# Patient Record
Sex: FEMALE | Race: WHITE | NOT HISPANIC OR LATINO | Employment: FULL TIME | URBAN - METROPOLITAN AREA
[De-identification: names, ages, dates, MRNs, and addresses within clinical notes are randomized per-mention and may not be internally consistent; named-entity substitution may affect disease eponyms.]

---

## 2017-11-17 ENCOUNTER — ALLSCRIPTS OFFICE VISIT (OUTPATIENT)
Dept: OTHER | Facility: OTHER | Age: 57
End: 2017-11-17

## 2017-11-17 DIAGNOSIS — Z13.6 ENCOUNTER FOR SCREENING FOR CARDIOVASCULAR DISORDERS: ICD-10-CM

## 2017-11-17 DIAGNOSIS — R63.4 ABNORMAL WEIGHT LOSS: ICD-10-CM

## 2017-11-17 DIAGNOSIS — Z00.00 ENCOUNTER FOR GENERAL ADULT MEDICAL EXAMINATION WITHOUT ABNORMAL FINDINGS: ICD-10-CM

## 2017-11-17 DIAGNOSIS — Z12.31 ENCOUNTER FOR SCREENING MAMMOGRAM FOR MALIGNANT NEOPLASM OF BREAST: ICD-10-CM

## 2017-11-29 ENCOUNTER — LAB CONVERSION - ENCOUNTER (OUTPATIENT)
Dept: OTHER | Facility: OTHER | Age: 57
End: 2017-11-29

## 2017-11-29 ENCOUNTER — GENERIC CONVERSION - ENCOUNTER (OUTPATIENT)
Dept: OTHER | Facility: OTHER | Age: 57
End: 2017-11-29

## 2017-11-29 LAB
A/G RATIO (HISTORICAL): 2 (CALC) (ref 1–2.5)
ALBUMIN SERPL BCP-MCNC: 4.3 G/DL (ref 3.6–5.1)
ALP SERPL-CCNC: 66 U/L (ref 33–130)
ALT SERPL W P-5'-P-CCNC: 10 U/L (ref 6–29)
AST SERPL W P-5'-P-CCNC: 14 U/L (ref 10–35)
BASOPHILS # BLD AUTO: 0 %
BASOPHILS # BLD AUTO: 0 CELLS/UL (ref 0–200)
BILIRUB SERPL-MCNC: 0.4 MG/DL (ref 0.2–1.2)
BUN SERPL-MCNC: 15 MG/DL (ref 7–25)
BUN/CREA RATIO (HISTORICAL): NORMAL (CALC) (ref 6–22)
CALCIUM SERPL-MCNC: 9.1 MG/DL (ref 8.6–10.4)
CHLORIDE SERPL-SCNC: 104 MMOL/L (ref 98–110)
CHOLEST SERPL-MCNC: 157 MG/DL
CHOLEST/HDLC SERPL: 3.1 (CALC)
CO2 SERPL-SCNC: 31 MMOL/L (ref 20–31)
CREAT SERPL-MCNC: 0.76 MG/DL (ref 0.5–1.05)
DEPRECATED RDW RBC AUTO: 11.9 % (ref 11–15)
EGFR AFRICAN AMERICAN (HISTORICAL): 101 ML/MIN/1.73M2
EGFR-AMERICAN CALC (HISTORICAL): 87 ML/MIN/1.73M2
EOSINOPHIL # BLD AUTO: 0.4 %
EOSINOPHIL # BLD AUTO: 11 CELLS/UL (ref 15–500)
GAMMA GLOBULIN (HISTORICAL): 2.2 G/DL (CALC) (ref 1.9–3.7)
GLUCOSE (HISTORICAL): 91 MG/DL (ref 65–99)
HCT VFR BLD AUTO: 35.6 % (ref 35–45)
HDLC SERPL-MCNC: 51 MG/DL
HGB BLD-MCNC: 12.1 G/DL (ref 11.7–15.5)
LDL CHOLESTEROL (HISTORICAL): 87 MG/DL (CALC)
LYMPHOCYTES # BLD AUTO: 1339 CELLS/UL (ref 850–3900)
LYMPHOCYTES # BLD AUTO: 49.6 %
MCH RBC QN AUTO: 32.7 PG (ref 27–33)
MCHC RBC AUTO-ENTMCNC: 34 G/DL (ref 32–36)
MCV RBC AUTO: 96.2 FL (ref 80–100)
MONOCYTES # BLD AUTO: 273 CELLS/UL (ref 200–950)
MONOCYTES (HISTORICAL): 10.1 %
NEUTROPHILS # BLD AUTO: 1077 CELLS/UL (ref 1500–7800)
NEUTROPHILS # BLD AUTO: 39.9 %
NON-HDL-CHOL (CHOL-HDL) (HISTORICAL): 106 MG/DL (CALC)
PLATELET # BLD AUTO: 205 THOUSAND/UL (ref 140–400)
PMV BLD AUTO: 10.2 FL (ref 7.5–12.5)
POTASSIUM SERPL-SCNC: 4 MMOL/L (ref 3.5–5.3)
RBC # BLD AUTO: 3.7 MILLION/UL (ref 3.8–5.1)
SODIUM SERPL-SCNC: 139 MMOL/L (ref 135–146)
TOTAL PROTEIN (HISTORICAL): 6.5 G/DL (ref 6.1–8.1)
TRIGL SERPL-MCNC: 96 MG/DL
TSH SERPL DL<=0.05 MIU/L-ACNC: 1.32 MIU/L (ref 0.4–4.5)
WBC # BLD AUTO: 2.7 THOUSAND/UL (ref 3.8–10.8)

## 2017-12-07 ENCOUNTER — GENERIC CONVERSION - ENCOUNTER (OUTPATIENT)
Dept: OTHER | Facility: OTHER | Age: 57
End: 2017-12-07

## 2018-01-08 DIAGNOSIS — D69.6 THROMBOCYTOPENIA (HCC): ICD-10-CM

## 2018-01-09 ENCOUNTER — GENERIC CONVERSION - ENCOUNTER (OUTPATIENT)
Dept: OTHER | Facility: OTHER | Age: 58
End: 2018-01-09

## 2018-01-09 ENCOUNTER — LAB CONVERSION - ENCOUNTER (OUTPATIENT)
Dept: OTHER | Facility: OTHER | Age: 58
End: 2018-01-09

## 2018-01-09 LAB
BASOPHILS # BLD AUTO: 0.3 %
BASOPHILS # BLD AUTO: 11 CELLS/UL (ref 0–200)
DEPRECATED RDW RBC AUTO: 12 % (ref 11–15)
EOSINOPHIL # BLD AUTO: 0.6 %
EOSINOPHIL # BLD AUTO: 21 CELLS/UL (ref 15–500)
HCT VFR BLD AUTO: 35.6 % (ref 35–45)
HGB BLD-MCNC: 12.2 G/DL (ref 11.7–15.5)
LYMPHOCYTES # BLD AUTO: 1726 CELLS/UL (ref 850–3900)
LYMPHOCYTES # BLD AUTO: 49.3 %
MCH RBC QN AUTO: 33.2 PG (ref 27–33)
MCHC RBC AUTO-ENTMCNC: 34.3 G/DL (ref 32–36)
MCV RBC AUTO: 97 FL (ref 80–100)
MONOCYTES # BLD AUTO: 343 CELLS/UL (ref 200–950)
MONOCYTES (HISTORICAL): 9.8 %
NEUTROPHILS # BLD AUTO: 1400 CELLS/UL (ref 1500–7800)
NEUTROPHILS # BLD AUTO: 40 %
PLATELET # BLD AUTO: 221 THOUSAND/UL (ref 140–400)
PMV BLD AUTO: 10.2 FL (ref 7.5–12.5)
RBC # BLD AUTO: 3.67 MILLION/UL (ref 3.8–5.1)
WBC # BLD AUTO: 3.5 THOUSAND/UL (ref 3.8–10.8)

## 2018-01-10 NOTE — PROGRESS NOTES
Assessment    1  Encounter for preventive health examination (V70 0) (Z00 00)   2  Visit for screening mammogram (V76 12) (Z12 31)   3  Recent weight loss (783 21) (R63 4)   4  Encounter for screening for cardiovascular disorders (V81 2) (Z13 6)    Plan  Encounter for screening for cardiovascular disorders    · (1) LIPID PANEL, FASTING; Status:Active; Requested for:17Nov2017; Health Maintenance    · Follow-up visit in 1 year Evaluation and Treatment  Follow-up  Status: Hold For -  Scheduling  Requested for: [id number]  Health Maintenance, Recent weight loss    · (1) CBC/PLT/DIFF; Status:Active; Requested for:17Nov2017;    · (1) COMPREHENSIVE METABOLIC PANEL; Status:Active; Requested for:17Nov2017;    · (Q) TSH, 3RD GENERATION W/REFLEX TO FT4; Status:Active; Requested  for:17Nov2017;   Screening for colon cancer    · 1 - Anjum JEAN, [facility]  *  Status: Active  Requested  for: 10GMA6513  Care Summary provided  : Yes  Visit for screening mammogram    · * MAMMO SCREENING BILATERAL W CAD; Status:Hold For - Scheduling; Requested  for:17Nov2017;     Discussion/Summary  healthy adult female Currently, she eats a healthy diet and has an adequate exercise regimen  the risks and benefits of cervical cancer screening were discussed next cervical cancer screening is due Breast cancer screening: mammogram has been ordered  Colorectal cancer screening: colonoscopy has been ordered  Screening lab work includes hemoglobin, glucose, lipid profile and thyroid function testing  The risks and benefits of immunizations were discussed, immunizations are up to date, patient declines immunizations and Flu vaccine  Advice and education were given regarding nutrition, aerobic exercise, weight bearing exercise, calcium supplements, vitamin D supplements, reproductive health, contraception, cardiovascular risk reduction and alcohol use     The patient was counseled regarding instructions for management, risk factor reductions, patient and family education, impressions, importance of compliance with treatment  Possible side effects of new medications were reviewed with the patient/guardian today  The treatment plan was reviewed with the patient/guardian  The patient/guardian understands and agrees with the treatment plan      Chief Complaint  New patient preventative      History of Present Illness  HM, Adult Female: The patient is being seen for a health maintenance evaluation  The last health maintenance visit was year(s) ago  Social History: Household members include 2 kids  She is   The patient has never smoked cigarettes  She reports occasional alcohol use  She has never used illicit drugs  General Health: The patient's health since the last visit is described as good  She has regular dental visits  She denies vision problems  Lifestyle:  She consumes a diverse and healthy diet  She does not have any weight concerns  She exercises regularly  She does not use tobacco  She denies alcohol use  She denies drug use  Reproductive health: the patient is postmenopausal   4 yrs  No postmenopausal spotting or bleeding  Screening: Cervical cancer screening includes a pap smear performed 6 yrs ago  Breast cancer screening includes a mammogram performed 6 yrs ago  She hasn't been previously screened for colorectal cancer  Metabolic screening includes uncertain timing of her last lipid profile, uncertain timing of her last glucose screening and uncertain timing of her last thyroid function test    HPI: Patient is here to reestablish care  Patient does not have any concerns today  Patient states that she recently modified her diet and stopped eating carbs  Patient states that she has lost around 20 pounds in the past few months  She is not up-to-date on any of her preventative screening  Patient declines flu shot today  Review of Systems    Constitutional: as noted in HPI     Eyes: No complaints of eye pain, no red eyes, no eyesight problems, no discharge, no dry eyes, no itching of eyes  ENT: no complaints of earache, no loss of hearing, no nose bleeds, no nasal discharge, no sore throat, no hoarseness  Cardiovascular: No complaints of slow heart rate, no fast heart rate, no chest pain, no palpitations, no leg claudication, no lower extremity edema  Respiratory: No complaints of shortness of breath, no wheezing, no cough, no SOB on exertion, no orthopnea, no PND  Gastrointestinal: No complaints of abdominal pain, no constipation, no nausea or vomiting, no diarrhea, no bloody stools  Genitourinary: No complaints of dysuria, no incontinence, no pelvic pain, no dysmenorrhea, no vaginal discharge or bleeding  Musculoskeletal: No complaints of arthralgias, no myalgias, no joint swelling or stiffness, no limb pain or swelling  Integumentary: No complaints of skin rash or lesions, no itching, no skin wounds, no breast pain or lump  Neurological: No complaints of headache, no confusion, no convulsions, no numbness, no dizziness or fainting, no tingling, no limb weakness, no difficulty walking  Psychiatric: Not suicidal, no sleep disturbance, no anxiety or depression, no change in personality, no emotional problems  Endocrine: No complaints of proptosis, no hot flashes, no muscle weakness, no deepening of the voice, no feelings of weakness  Hematologic/Lymphatic: No complaints of swollen glands, no swollen glands in the neck, does not bleed easily, does not bruise easily  Active Problems    1  Cervical cancer screening (V76 2) (Z12 4)   2  Encounter for routine gynecological examination (V72 31) (Z01 419)   3  Encounter for screening colonoscopy (V76 51) (Z12 11)   4  Encounter for screening mammogram for malignant neoplasm of breast (V76 12)   (Z12 31)   5  Other screening mammogram (V76 12) (Z12 31)   6   Screening for colon cancer (V76 51) (Z12 11)    Past Medical History    · Acute atopic conjunctivitis, unspecified laterality (372 05) (H10 10)    Family History  Mother    · Denied: Family history of depression   · Denied: Family history of substance abuse  Father    · Denied: Family history of depression   · Denied: Family history of substance abuse  Sibling    · Denied: Family history of depression   · Denied: Family history of substance abuse    Social History    · Former smoker (G55 14) (E36 314)   · Former smoker (G71 80) (Q02 954)    Current Meds   1  No Reported Medications Recorded    Allergies    1  No Known Drug Allergies    Vitals   Recorded: 03KEV0382 09:47AM   Heart Rate 62   Respiration 14   Systolic 131   Diastolic 72   Height 5 ft 6 in   Weight 133 lb    BMI Calculated 21 47   BSA Calculated 1 68   O2 Saturation 99     Physical Exam    Constitutional   General appearance: No acute distress, well appearing and well nourished  Ears, Nose, Mouth, and Throat   Otoscopic examination: Tympanic membranes translucent with normal light reflex  Canals patent without erythema  Oropharynx: Normal with no erythema, edema, exudate or lesions  Neck   Neck: Supple, symmetric, trachea midline, no masses  Pulmonary   Respiratory effort: No increased work of breathing or signs of respiratory distress  Auscultation of lungs: Clear to auscultation  Cardiovascular   Auscultation of heart: Normal rate and rhythm, normal S1 and S2, no murmurs  Peripheral vascular exam: Normal     Abdomen   Abdomen: Non-tender, no masses  Liver and spleen: No hepatomegaly or splenomegaly  Musculoskeletal   Gait and station: Normal     Digits and nails: Normal without clubbing or cyanosis  Stability: Normal     Neurologic   Cortical function: Normal mental status  Coordination: Normal finger to nose and heel to shin      Psychiatric   Orientation to person, place, and time: Normal     Mood and affect: Normal        Procedure    Procedure:   Results: 20/25 in both eyes without corrective device, 20/25 in the right eye without corrective device, 20/25 in the left eye without corrective device      Health Management  Cervical cancer screening   (every) THINPREP PAP; every 3 years; Next Due: 35VVP0179; Overdue  Other screening mammogram   Digital Bilateral Screening Mammogram With CAD; every 1 year; Next Due: 00DGV5501; Overdue  Screening for colon cancer   COLONOSCOPY; every 10 years; Next Due: 49UQF7749;  Overdue    Signatures   Electronically signed by : Ridge Mistry MD; Nov 17 2017 11:25AM EST                       (Author)

## 2018-01-12 VITALS
SYSTOLIC BLOOD PRESSURE: 112 MMHG | WEIGHT: 133 LBS | RESPIRATION RATE: 14 BRPM | HEIGHT: 66 IN | OXYGEN SATURATION: 99 % | HEART RATE: 62 BPM | BODY MASS INDEX: 21.38 KG/M2 | DIASTOLIC BLOOD PRESSURE: 72 MMHG

## 2018-01-12 NOTE — RESULT NOTES
Verified Results  (Q) TSH, 3RD GENERATION W/REFLEX TO FT4 [id number] 07:01AM [facility]   REPORT COMMENT:  FASTING:YES     Test Name Result Flag Reference   TSH W/REFLEX TO FT4 1 32 mIU/L  0 40-4 50     (1) LIPID PANEL, FASTING [id number] 07:01AM [facility]     Test Name Result Flag Reference   CHOLESTEROL, TOTAL 157 mg/dL  <200   HDL CHOLESTEROL 51 mg/dL  >87   TRIGLICERIDES 96 mg/dL  <920   LDL-CHOLESTEROL 87 mg/dL (calc)     Reference range: <100     Desirable range <100 mg/dL for patients with CHD or  diabetes and <70 mg/dL for diabetic patients with  known heart disease  LDL-C is now calculated using the Daniel-Caballero   calculation, which is a validated novel method providing   better accuracy than the Friedewald equation in the   estimation of LDL-C  [facility]  8853;529(53): 4500-3422   (http://Pathable/faq/ZJG565)   CHOL/HDLC RATIO 3 1 (calc)  <5 0   NON HDL CHOLESTEROL 106 mg/dL (calc)  <130   For patients with diabetes plus 1 major ASCVD risk   factor, treating to a non-HDL-C goal of <100 mg/dL   (LDL-C of <70 mg/dL) is considered a therapeutic   option  (1) COMPREHENSIVE METABOLIC PANEL [id number] 98:25NW [facility]     Test Name Result Flag Reference   GLUCOSE 91 mg/dL  65-99   Fasting reference interval   UREA NITROGEN (BUN) 15 mg/dL  7-25   CREATININE 0 76 mg/dL  0 50-1 05   For patients >52years of age, the reference limit  for Creatinine is approximately 13% higher for people  identified as -American  eGFR NON-AFR   AMERICAN 87 mL/min/1 73m2  > OR = 60   eGFR AFRICAN AMERICAN 101 mL/min/1 73m2  > OR = 60   BUN/CREATININE RATIO   9-81   NOT APPLICABLE (calc)   SODIUM 139 mmol/L  135-146   POTASSIUM 4 0 mmol/L  3 5-5 3   CHLORIDE 104 mmol/L     CARBON DIOXIDE 31 mmol/L  20-31   CALCIUM 9 1 mg/dL  8 6-10 4   PROTEIN, TOTAL 6 5 g/dL  6 1-8 1   ALBUMIN 4 3 g/dL  3 6-5 1   GLOBULIN 2 2 g/dL (calc)  1 9-3 7   ALBUMIN/GLOBULIN RATIO 2 0 (calc)  1 0-2 5   BILIRUBIN, TOTAL 0 4 mg/dL  0 2-1 2   ALKALINE PHOSPHATASE 66 U/L     AST 14 U/L  10-35   ALT 10 U/L  6-29     (1) CBC/PLT/DIFF [id number] 07:01AM [facility]     Test Name Result Flag Reference   WHITE BLOOD CELL COUNT 2 7 Thousand/uL L 3 8-10 8   RED BLOOD CELL COUNT 3 70 Million/uL L 3 80-5 10   HEMOGLOBIN 12 1 g/dL  11 7-15 5   HEMATOCRIT 35 6 %  35 0-45 0   MCV 96 2 fL  80 0-100 0   MCH 32 7 pg  27 0-33 0   MCHC 34 0 g/dL  32 0-36 0   RDW 11 9 %  11 0-15 0   PLATELET COUNT 018 Thousand/uL  140-400   ABSOLUTE NEUTROPHILS 1077 cells/uL L 5610-7004   ABSOLUTE LYMPHOCYTES 1339 cells/uL  850-3900   ABSOLUTE MONOCYTES 273 cells/uL  200-950   ABSOLUTE EOSINOPHILS 11 cells/uL L    ABSOLUTE BASOPHILS 0 cells/uL  0-200   NEUTROPHILS 39 9 %     LYMPHOCYTES 49 6 %     MONOCYTES 10 1 %     EOSINOPHILS 0 4 %     BASOPHILS 0 0 %     MPV 10 2 fL  7 5-12 5

## 2018-01-23 NOTE — RESULT NOTES
Discussion/Summary   Repeat CBC x 6 months  Verified Results  (1) CBC/PLT/DIFF [id number] 07:02AM [facility]     Test Name Result Flag Reference   WHITE BLOOD CELL COUNT 3 5 Thousand/uL L 3 8-10 8   RED BLOOD CELL COUNT 3 67 Million/uL L 3 80-5 10   HEMOGLOBIN 12 2 g/dL  11 7-15 5   HEMATOCRIT 35 6 %  35 0-45 0   MCV 97 0 fL  80 0-100 0   MCH 33 2 pg H 27 0-33 0   MCHC 34 3 g/dL  32 0-36 0   RDW 12 0 %  11 0-15 0   PLATELET COUNT 695 Thousand/uL  140-400   ABSOLUTE NEUTROPHILS 1400 cells/uL L 9552-8538   ABSOLUTE LYMPHOCYTES 1726 cells/uL  850-3900   ABSOLUTE MONOCYTES 343 cells/uL  200-950   ABSOLUTE EOSINOPHILS 21 cells/uL     ABSOLUTE BASOPHILS 11 cells/uL  0-200   NEUTROPHILS 40 %     LYMPHOCYTES 49 3 %     MONOCYTES 9 8 %     EOSINOPHILS 0 6 %     BASOPHILS 0 3 %     MPV 10 2 fL  7 5-12 5       Plan  Leukopenia    · (1) CBC/PLT/DIFF; Status:Active; Requested KEF:[id number];    · (1) COMPREHENSIVE METABOLIC PANEL; Status:Active;  Requested PZX:[id number];

## 2018-01-24 VITALS
BODY MASS INDEX: 21.38 KG/M2 | OXYGEN SATURATION: 98 % | HEART RATE: 65 BPM | RESPIRATION RATE: 14 BRPM | HEIGHT: 66 IN | SYSTOLIC BLOOD PRESSURE: 130 MMHG | WEIGHT: 133 LBS | DIASTOLIC BLOOD PRESSURE: 82 MMHG

## 2018-02-09 ENCOUNTER — OFFICE VISIT (OUTPATIENT)
Dept: OBGYN CLINIC | Facility: CLINIC | Age: 58
End: 2018-02-09
Payer: COMMERCIAL

## 2018-02-09 VITALS
SYSTOLIC BLOOD PRESSURE: 110 MMHG | BODY MASS INDEX: 21.66 KG/M2 | HEIGHT: 65 IN | DIASTOLIC BLOOD PRESSURE: 68 MMHG | WEIGHT: 130 LBS

## 2018-02-09 DIAGNOSIS — Z01.419 WELL WOMAN EXAM WITH ROUTINE GYNECOLOGICAL EXAM: Primary | ICD-10-CM

## 2018-02-09 DIAGNOSIS — N95.2 VAGINAL ATROPHY: ICD-10-CM

## 2018-02-09 PROBLEM — D72.819 LEUKOPENIA: Status: ACTIVE | Noted: 2017-12-07

## 2018-02-09 PROBLEM — R63.4 RECENT WEIGHT LOSS: Status: ACTIVE | Noted: 2017-11-17

## 2018-02-09 PROCEDURE — 87624 HPV HI-RISK TYP POOLED RSLT: CPT | Performed by: OBSTETRICS & GYNECOLOGY

## 2018-02-09 PROCEDURE — G0145 SCR C/V CYTO,THINLAYER,RESCR: HCPCS | Performed by: OBSTETRICS & GYNECOLOGY

## 2018-02-09 PROCEDURE — S0610 ANNUAL GYNECOLOGICAL EXAMINA: HCPCS | Performed by: OBSTETRICS & GYNECOLOGY

## 2018-02-09 NOTE — PROGRESS NOTES
ASSESSMENT & PLAN: Marcie Sherman is a 62 y o   with normal gynecologic exam     1   Routine well woman exam done today  2  Pap and HPV:  The patient's last pap was   Pap and cotesting was done today  Current ASCCP Guidelines reviewed  3   Mammogram already scheduled  4  Colonoscopy- declined  5  The following were reviewed in today's visit: breast self exam, mammography screening ordered, menopause, adequate intake of calcium and vitamin D, exercise and healthy diet  6  Vaginal Atrophy- Discussed use of estrace cream   Patient is unsure if she would like to use it  If she decides that she does, will come to office to  samples, and will need a 3 month estrace check      CC:  Annual Gynecologic Examination    HPI: Marcie Sherman is a 62 y o   who presents for annual gynecologic examination  She has the following concerns:  Patient has not been sexually active for years because of pain and lack of desire  Does not speak about it with her   Has twin girls (27 yo) by  and c/s (second twin was a surprise)    Health Maintenance:    She exercises 4 days per week with treadmill  She wears her seatbelt routinely  She does not perform regular monthly self breast exams  She feels safe at home  Patients does follow a healthy diet  Her last pap   Last mammogram: in her 42's  Last colonoscopy: never    History reviewed  No pertinent past medical history  Past Surgical History:   Procedure Laterality Date     SECTION         Past OB/Gyn History:  OB History      Para Term  AB Living    1         2    SAB TAB Ectopic Multiple Live Births          1 1         No LMP recorded  Patient is postmenopausal    History of sexually transmitted infection No  History of abnormal pap smears  Yes in her 20's  Patient is not currently sexually active  History reviewed  No pertinent family history      Social History:  Social History     Social History  Marital status: /Civil Union     Spouse name: N/A    Number of children: N/A    Years of education: N/A     Occupational History    Not on file  Social History Main Topics    Smoking status: Former Smoker    Smokeless tobacco: Never Used    Alcohol use 0 6 oz/week     1 Glasses of wine per week    Drug use: No    Sexual activity: Not Currently     Other Topics Concern    Not on file     Social History Narrative    No narrative on file     Presently lives with   Patient is   Patient is currently employed  No Known Allergies  No current outpatient prescriptions on file  Review of Systems:  A complete review of systems was performed and was negative, except as listed  none    Physical Exam:  /68   Ht 5' 5" (1 651 m)   Wt 59 kg (130 lb)   BMI 21 63 kg/m²    GEN: The patient was alert and oriented x3, pleasant well-appearing female in no acute distress  HEENT:  Unremarkable, no anterior or posterior lymphadenopathy, no thyromegaly  CV:  RRR, no murmurs  RESP:  Clear to auscultation bilaterally  BREAST:  Symmetric breasts with no palpable breast masses or obvious breast lesions  She has no retractions or nipple discharge  She has no axillary abnormalities or palpable masses  Self breast exam is taught  ABD:  Soft, nontender, nondistended, normoactive bowel sounds,  EXT:  WWP, nontender, no edema  BACK:  No CVA tenderness, no tenderness to palpation along spine  PELVIC:  Normal appearing external female genitalia, atrophic vaginal epithelium, No discharge  Cervix present   Bimanual: absent CMT,  normal uterus, non-tender  No palpable adnexal masses

## 2018-02-14 ENCOUNTER — HOSPITAL ENCOUNTER (OUTPATIENT)
Dept: MAMMOGRAPHY | Facility: HOSPITAL | Age: 58
Discharge: HOME | End: 2018-02-14
Payer: COMMERCIAL

## 2018-02-14 DIAGNOSIS — Z12.31 ENCOUNTER FOR SCREENING MAMMOGRAM FOR MALIGNANT NEOPLASM OF BREAST: ICD-10-CM

## 2018-02-14 LAB
HPV RRNA GENITAL QL NAA+PROBE: NORMAL
LAB AP GYN PRIMARY INTERPRETATION: NORMAL
Lab: NORMAL

## 2018-02-14 PROCEDURE — 77067 SCR MAMMO BI INCL CAD: CPT

## 2018-06-18 DIAGNOSIS — D72.819 DECREASED WHITE BLOOD CELL COUNT: ICD-10-CM

## 2018-06-19 LAB
ALBUMIN SERPL-MCNC: 4.2 G/DL (ref 3.6–5.1)
ALBUMIN/GLOB SERPL: 1.9 (CALC) (ref 1–2.5)
ALP SERPL-CCNC: 69 U/L (ref 33–130)
ALT SERPL-CCNC: 12 U/L (ref 6–29)
AST SERPL-CCNC: 17 U/L (ref 10–35)
BASOPHILS # BLD AUTO: 10 CELLS/UL (ref 0–200)
BASOPHILS NFR BLD AUTO: 0.3 %
BILIRUB SERPL-MCNC: 0.4 MG/DL (ref 0.2–1.2)
BUN SERPL-MCNC: 16 MG/DL (ref 7–25)
BUN/CREAT SERPL: NORMAL (CALC) (ref 6–22)
CALCIUM SERPL-MCNC: 9.4 MG/DL (ref 8.6–10.4)
CHLORIDE SERPL-SCNC: 104 MMOL/L (ref 98–110)
CO2 SERPL-SCNC: 30 MMOL/L (ref 20–31)
CREAT SERPL-MCNC: 0.83 MG/DL (ref 0.5–1.05)
EOSINOPHIL # BLD AUTO: 10 CELLS/UL (ref 15–500)
EOSINOPHIL NFR BLD AUTO: 0.3 %
ERYTHROCYTE [DISTWIDTH] IN BLOOD BY AUTOMATED COUNT: 12.3 % (ref 11–15)
GLOBULIN SER CALC-MCNC: 2.2 G/DL (CALC) (ref 1.9–3.7)
GLUCOSE SERPL-MCNC: 81 MG/DL (ref 65–99)
HCT VFR BLD AUTO: 34.9 % (ref 35–45)
HGB BLD-MCNC: 11.9 G/DL (ref 11.7–15.5)
LYMPHOCYTES # BLD AUTO: 1827 CELLS/UL (ref 850–3900)
LYMPHOCYTES NFR BLD AUTO: 57.1 %
MCH RBC QN AUTO: 33.3 PG (ref 27–33)
MCHC RBC AUTO-ENTMCNC: 34.1 G/DL (ref 32–36)
MCV RBC AUTO: 97.8 FL (ref 80–100)
MONOCYTES # BLD AUTO: 285 CELLS/UL (ref 200–950)
MONOCYTES NFR BLD AUTO: 8.9 %
NEUTROPHILS # BLD AUTO: 1069 CELLS/UL (ref 1500–7800)
NEUTROPHILS NFR BLD AUTO: 33.4 %
PLATELET # BLD AUTO: 211 THOUSAND/UL (ref 140–400)
PMV BLD REES-ECKER: 10 FL (ref 7.5–12.5)
POTASSIUM SERPL-SCNC: 3.8 MMOL/L (ref 3.5–5.3)
PROT SERPL-MCNC: 6.4 G/DL (ref 6.1–8.1)
RBC # BLD AUTO: 3.57 MILLION/UL (ref 3.8–5.1)
SL AMB EGFR AFRICAN AMERICAN: 90 ML/MIN/1.73M2
SL AMB EGFR NON AFRICAN AMERICAN: 78 ML/MIN/1.73M2
SODIUM SERPL-SCNC: 140 MMOL/L (ref 135–146)
WBC # BLD AUTO: 3.2 THOUSAND/UL (ref 3.8–10.8)

## 2018-06-26 ENCOUNTER — TELEPHONE (OUTPATIENT)
Dept: FAMILY MEDICINE CLINIC | Facility: CLINIC | Age: 58
End: 2018-06-26

## 2018-06-26 NOTE — TELEPHONE ENCOUNTER
PUT PATIENT IN FOR 6/27/2018 WIT DR Christa Law   PER HER INSTRUCTIONS ON LAB TO HAVE PATIENT FOLLOW UP

## 2018-06-27 ENCOUNTER — OFFICE VISIT (OUTPATIENT)
Dept: FAMILY MEDICINE CLINIC | Facility: CLINIC | Age: 58
End: 2018-06-27
Payer: COMMERCIAL

## 2018-06-27 VITALS
HEART RATE: 78 BPM | SYSTOLIC BLOOD PRESSURE: 120 MMHG | WEIGHT: 126 LBS | OXYGEN SATURATION: 98 % | HEIGHT: 65 IN | BODY MASS INDEX: 20.99 KG/M2 | DIASTOLIC BLOOD PRESSURE: 62 MMHG

## 2018-06-27 DIAGNOSIS — Z12.11 ENCOUNTER FOR SCREENING COLONOSCOPY: ICD-10-CM

## 2018-06-27 DIAGNOSIS — R79.89 ABNORMAL CBC: Primary | ICD-10-CM

## 2018-06-27 PROCEDURE — 3008F BODY MASS INDEX DOCD: CPT | Performed by: FAMILY MEDICINE

## 2018-06-27 PROCEDURE — 99213 OFFICE O/P EST LOW 20 MIN: CPT | Performed by: FAMILY MEDICINE

## 2018-06-27 PROCEDURE — 1036F TOBACCO NON-USER: CPT | Performed by: FAMILY MEDICINE

## 2018-06-27 NOTE — PROGRESS NOTES
Subjective:      Patient ID: Benita Hercules is a 62 y o  female  HPI   patient is here  To review labs which were ordered to follow-up on her cell counts  Her labs reveal a persistently low white  And red cell count  Her platelet numbers are within normal limits  Patient is denying any symptoms of frequent sore throat/fever/chills/swellings in her neck/night sweats/bone pain  Currently not on any medications  states that overall she feels very healthy  History reviewed  No pertinent past medical history  History reviewed  No pertinent family history  Past Surgical History:   Procedure Laterality Date     SECTION          reports that she has quit smoking  She has never used smokeless tobacco  She reports that she drinks about 0 6 oz of alcohol per week   She reports that she does not use drugs  No current outpatient prescriptions on file  The following portions of the patient's history were reviewed and updated as appropriate: allergies, current medications, past family history, past medical history, past social history, past surgical history and problem list     Review of Systems   Constitutional: Negative  Respiratory: Negative  Negative for shortness of breath  Cardiovascular: Negative  Negative for chest pain and palpitations  Musculoskeletal: Negative for myalgias  Neurological: Negative  Psychiatric/Behavioral: Negative  Objective:    /62   Pulse 78   Ht 5' 5" (1 651 m)   Wt 57 2 kg (126 lb)   SpO2 98%   BMI 20 97 kg/m²      Physical Exam   Constitutional: She is oriented to person, place, and time  She appears well-developed and well-nourished  Cardiovascular: Normal rate, regular rhythm and normal heart sounds  Pulmonary/Chest: Effort normal and breath sounds normal    Abdominal: Soft  Bowel sounds are normal    Neurological: She is alert and oriented to person, place, and time     Psychiatric: Her behavior is normal  Judgment normal  Recent Results (from the past 1008 hour(s))   Comprehensive metabolic panel    Collection Time: 06/18/18  7:05 AM   Result Value Ref Range    SL AMB GLUCOSE 81 65 - 99 mg/dL    BUN 16 7 - 25 mg/dL    Creatinine, Serum 0 83 0 50 - 1 05 mg/dL    eGFR Non  78 > OR = 60 mL/min/1 73m2    SL AMB EGFR  90 > OR = 60 mL/min/1 73m2    SL AMB BUN/CREATININE RATIO NOT APPLICABLE 6 - 22 (calc)    SL AMB SODIUM 140 135 - 146 mmol/L    SL AMB POTASSIUM 3 8 3 5 - 5 3 mmol/L    SL AMB CHLORIDE 104 98 - 110 mmol/L    SL AMB CARBON DIOXIDE 30 20 - 31 mmol/L    SL AMB CALCIUM 9 4 8 6 - 10 4 mg/dL    SL AMB PROTEIN, TOTAL 6 4 6 1 - 8 1 g/dL    Serum Albumin 4 2 3 6 - 5 1 g/dL    SL AMB GLOBULIN 2 2 1 9 - 3 7 g/dL (calc)    SL AMB ALBUMIN/GLOBULIN RATIO 1 9 1 0 - 2 5 (calc)    SL AMB BILIRUBIN, TOTAL 0 4 0 2 - 1 2 mg/dL    SL AMB ALKALINE PHOSPHATASE 69 33 - 130 U/L    SL AMB AST 17 10 - 35 U/L    SL AMB ALT 12 6 - 29 U/L   CBC and differential    Collection Time: 06/18/18  7:05 AM   Result Value Ref Range    SL AMB LAB WHITE BLOOD CELL COUNT 3 2 (L) 3 8 - 10 8 Thousand/uL    SL AMB LAB RED BLOOD CELLS 3 57 (L) 3 80 - 5 10 Million/uL    Hemoglobin 11 9 11 7 - 15 5 g/dL    Hematocrit 34 9 (L) 35 0 - 45 0 %    MCV 97 8 80 0 - 100 0 fL    MCH 33 3 (H) 27 0 - 33 0 pg    MCHC 34 1 32 0 - 36 0 g/dL    RDW 12 3 11 0 - 15 0 %    Platelet Count 444 912 - 400 Thousand/uL    SL AMB MPV 10 0 7 5 - 12 5 fL    Neutrophils (Absolute) 1,069 (L) 1,500 - 7,800 cells/uL    Lymphocytes (Absolute) 1,827 850 - 3,900 cells/uL    Monocytes (Absolute) 285 200 - 950 cells/uL    Eosinophils (Absolute) 10 (L) 15 - 500 cells/uL    Basophils (Absolute) 10 0 - 200 cells/uL    Neutrophils 33 4 %    Lymphocytes 57 1 %    Monocytes 8 9 %    Eosinophils 0 3 %    Basophils 0 3 %       Assessment/Plan:    Persisting abnormality of the  WBCs and RBCs, for which pt advised to consult specialist     Diagnoses and all orders for this visit:    Abnormal CBC  -     Ambulatory referral to Hematology / Oncology; Future  -     US abdomen complete; Future  -     CBC and differential; Future  -     Comprehensive metabolic panel; Future  -     Ambulatory referral to Hematology / Oncology; Future    Encounter for screening colonoscopy  -     Ambulatory referral to Gastroenterology;  Future

## 2022-10-05 ENCOUNTER — OFFICE VISIT (OUTPATIENT)
Dept: PODIATRY | Facility: CLINIC | Age: 62
End: 2022-10-05
Payer: COMMERCIAL

## 2022-10-05 VITALS
OXYGEN SATURATION: 99 % | BODY MASS INDEX: 18.86 KG/M2 | WEIGHT: 113.2 LBS | HEART RATE: 107 BPM | DIASTOLIC BLOOD PRESSURE: 58 MMHG | HEIGHT: 65 IN | SYSTOLIC BLOOD PRESSURE: 106 MMHG

## 2022-10-05 DIAGNOSIS — M77.8 CAPSULITIS OF LEFT FOOT: ICD-10-CM

## 2022-10-05 DIAGNOSIS — S93.602A FOOT SPRAIN, LEFT, INITIAL ENCOUNTER: Primary | ICD-10-CM

## 2022-10-05 PROCEDURE — 20550 NJX 1 TENDON SHEATH/LIGAMENT: CPT | Performed by: PODIATRIST

## 2022-10-05 PROCEDURE — 99202 OFFICE O/P NEW SF 15 MIN: CPT | Performed by: PODIATRIST

## 2022-10-05 RX ORDER — TRIAMCINOLONE ACETONIDE 40 MG/ML
40 INJECTION, SUSPENSION INTRA-ARTICULAR; INTRAMUSCULAR ONCE
Status: SHIPPED | OUTPATIENT
Start: 2022-10-05

## 2022-10-05 NOTE — PROGRESS NOTES
Assessment/Plan:    X-rays taken of the patient's feet at [facility] were negative for any osseous pathology bilaterally  The patient's clinical examination is most consistent with a left midfoot sprain or capsulitis the midfoot joints  Ultimately, this may also be a sequela of her multiple myeloma  Treatment options were discussed, and to avoid any potential side effects from oral medications we decided to proceed with injection therapy  A cortisone injection was administered to the area articulation between the cuboid and the 4th and 5th metatarsal bases without complication  Will also brace the patient's left foot and a lace-up AFO to be used with a pair of supportive sneakers  Have recommended follow-up in 3 weeks to ascertain efficacy of injection therapy and bracing  Diagnoses and all orders for this visit:    Foot sprain, left, initial encounter  -     Ankle Cude ankle/Ankle Brace  -     triamcinolone acetonide (KENALOG-40) 40 mg/mL injection 40 mg    Capsulitis of left foot  -     triamcinolone acetonide (KENALOG-40) 40 mg/mL injection 40 mg    Other orders  -     Foot injection          Subjective:      Patient ID: Truong Rubio is a 58 y o  female  The patient presents today with a chief complaint of left foot pain that has been present for over a month  At one point she states that the pain was in the same area on both feet, but the right foot pain has resolved  She presents today in a wheelchair but states that she does walk without any assistive devices for short distances  This is when she notices the most pain on her left foot  Pain is relieved by getting off her foot, she has no pain when nonweightbearing to the left lower extremity  She does have a history of multiple myeloma and has undergone multiple treatment modalities, including at [facility]   At present, she states that all therapy has been stopped        The following portions of the patient's history were reviewed and updated as appropriate: allergies, current medications, past family history, past medical history, past social history, past surgical history and problem list       PAST MEDICAL HISTORY:  History reviewed  No pertinent past medical history  PAST SURGICAL HISTORY:  Past Surgical History:   Procedure Laterality Date     SECTION          ALLERGIES:  Piperacillin-tazobactam in dex    MEDICATIONS:  No current outpatient medications on file  Current Facility-Administered Medications   Medication Dose Route Frequency Provider Last Rate Last Admin    triamcinolone acetonide (KENALOG-40) 40 mg/mL injection 40 mg  40 mg Intra-lesional Once Elizabeth Azevedo DPM           SOCIAL HISTORY:  Social History     Socioeconomic History    Marital status: /Civil Union     Spouse name: None    Number of children: None    Years of education: None    Highest education level: None   Occupational History    None   Tobacco Use    Smoking status: Former Smoker    Smokeless tobacco: Never Used   Substance and Sexual Activity    Alcohol use: Yes     Alcohol/week: 1 0 standard drink     Types: 1 Glasses of wine per week     Comment: CONSUMES ALCOHOL OCCASIONALLY    Drug use: No    Sexual activity: Not Currently   Other Topics Concern    None   Social History Narrative    DAILY CAFFEINE CONSUMPTION 2-3 SERVINGS A DAY    EXERCISES FREQUENTLY     Social Determinants of Health     Financial Resource Strain: Not on file   Food Insecurity: Not on file   Transportation Needs: Not on file   Physical Activity: Not on file   Stress: Not on file   Social Connections: Not on file   Intimate Partner Violence: Not on file   Housing Stability: Not on file        Review of Systems   Constitutional: Negative for chills and fever  HENT: Negative for ear pain and sore throat  Eyes: Negative for pain and visual disturbance  Respiratory: Negative for cough and shortness of breath  Cardiovascular: Negative for chest pain and palpitations  Gastrointestinal: Negative for abdominal pain and vomiting  Musculoskeletal: Positive for arthralgias  Negative for back pain  Skin: Negative for color change and rash  Neurological: Negative for seizures and syncope  Psychiatric/Behavioral: Negative  All other systems reviewed and are negative  Objective:      /58   Pulse (!) 107   Ht 5' 5" (1 651 m)   Wt 51 3 kg (113 lb 3 2 oz)   SpO2 99%   BMI 18 84 kg/m²          Physical Exam  Constitutional:       Appearance: Normal appearance  HENT:      Head: Normocephalic and atraumatic  Nose: Nose normal    Cardiovascular:      Pulses:           Dorsalis pedis pulses are 1+ on the left side  Posterior tibial pulses are 1+ on the left side  Pulmonary:      Effort: Pulmonary effort is normal    Musculoskeletal:        Feet:    Feet:      Left foot:      Skin integrity: Skin integrity normal       Comments: There is a diffuse area of tenderness to palpation over the dorsal lateral aspect of patient's left foot; the point of maximum tenderness appears to be there to take lesion the cuboid to the base of the 4th and 5th metatarsals the left foot; there is no tenderness to palpation of the ATFL as sinus tarsi, there is no tenderness to palpation of the peroneal tendons; there is no tenderness to the medial ankle ligament; there is no erythema nor ecchymosis no edema nor calor to the patient's left foot  Skin:     General: Skin is warm  Capillary Refill: Capillary refill takes less than 2 seconds  Neurological:      General: No focal deficit present  Mental Status: She is alert and oriented to person, place, and time  Psychiatric:         Mood and Affect: Mood normal          Behavior: Behavior normal          Thought Content:  Thought content normal                  Foot injection     Date/Time 10/5/2022 2:00 AM     Performed by  Soha Escoto DPM Authorized by Perri Vazquez DPM      Universal Protocol   Consent: Verbal consent obtained  Risks and benefits: risks, benefits and alternatives were discussed  Consent given by: patient  Time out: Immediately prior to procedure a "time out" was called to verify the correct patient, procedure, equipment, support staff and site/side marked as required  Timeout called at: 10/5/2022 2:00 PM   Patient understanding: patient states understanding of the procedure being performed  Patient consent: the patient's understanding of the procedure matches consent given  Patient identity confirmed: verbally with patient and provided demographic data        Local anesthesia used: yes      Anesthesia: local infiltration     Anesthesia   Local anesthesia used: yes  Local Anesthetic: lidocaine 2% without epinephrine and bupivacaine 0 25% without epinephrine  Anesthetic total: 1 5 mL     Sedation   Patient sedated: no        Specimen: no    Culture: no   Procedure Details   Procedure Notes: The dorsal/lateral left midfoot was prepped with alcohol  Ethyl chloride was administered for topical anesthesia  I proceeded to inject 1 ml 2% xylocaine plain/0 5 ml 0 25% bupivacaine plain/0 5 ml kenalog 40 to the articulation between the cuboid and 4th and 5th metatarsal bases  The patient tolerated it well